# Patient Record
Sex: MALE | Race: WHITE | NOT HISPANIC OR LATINO | Employment: FULL TIME | URBAN - METROPOLITAN AREA
[De-identification: names, ages, dates, MRNs, and addresses within clinical notes are randomized per-mention and may not be internally consistent; named-entity substitution may affect disease eponyms.]

---

## 2018-02-05 ENCOUNTER — TREATMENT (OUTPATIENT)
Dept: PHYSICAL THERAPY | Facility: CLINIC | Age: 48
End: 2018-02-05

## 2018-02-05 ENCOUNTER — TRANSCRIBE ORDERS (OUTPATIENT)
Dept: PHYSICAL THERAPY | Facility: CLINIC | Age: 48
End: 2018-02-05

## 2018-02-05 DIAGNOSIS — M54.50 ACUTE BILATERAL LOW BACK PAIN WITHOUT SCIATICA: Primary | ICD-10-CM

## 2018-02-05 DIAGNOSIS — S39.012A STRAIN OF LUMBAR REGION, INITIAL ENCOUNTER: Primary | ICD-10-CM

## 2018-02-05 PROCEDURE — 97110 THERAPEUTIC EXERCISES: CPT | Performed by: PHYSICAL THERAPIST

## 2018-02-05 PROCEDURE — 97014 ELECTRIC STIMULATION THERAPY: CPT | Performed by: PHYSICAL THERAPIST

## 2018-02-05 PROCEDURE — 97001 PR PHYS THERAPY EVALUATION: CPT | Performed by: PHYSICAL THERAPIST
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
## 2018-02-09 ENCOUNTER — TREATMENT (OUTPATIENT)
Dept: PHYSICAL THERAPY | Facility: CLINIC | Age: 48
End: 2018-02-09

## 2018-02-09 DIAGNOSIS — M54.50 ACUTE BILATERAL LOW BACK PAIN WITHOUT SCIATICA: Primary | ICD-10-CM

## 2018-02-09 PROCEDURE — 97110 THERAPEUTIC EXERCISES: CPT | Performed by: PHYSICAL THERAPIST

## 2018-02-09 PROCEDURE — 97014 ELECTRIC STIMULATION THERAPY: CPT | Performed by: PHYSICAL THERAPIST

## 2018-02-09 NOTE — PROGRESS NOTES
Physical Therapy Daily Progress Note    TOTAL TIME: 80 MINUTES    Nickho Paez reports: back is steadily improving      Objective   See Exercise, Manual, and Modality Logs for complete treatment.     THERAPEUTIC EXERCISES/ACTIVITIES ADDED TODAY: see updated VHI picture sheet in media section    Assessment/Plan  PATIENT NEEDS CONTINUED PHYSICAL THERAPY IN ORDER TO ACHIEVE FULL ROM, STRENGTH AND FUNCTION WITH NO REPORTS OF PAIN IN ORDER TO RTW WITHOUT RESTRICTIONS AND WITHOUT PAIN OR DYSFUNCTION       Progress per Plan of Care and Progress strengthening /stabilization /functional activity           Manual Therapy:         mins  36025;  Therapeutic Exercise:    60     mins  85519;     Neuromuscular Roni:        mins  48238;    Therapeutic Activity:          mins  40706;     Gait Training:           mins  33353;     Ultrasound:          mins  29154;    Electrical Stimulation:    20     mins  56859 ( );  Dry Needling          mins self-pay    Timed Treatment:   60   mins   Total Treatment:     80   mins    Jesus Celis PT  Physical Therapist

## 2018-02-12 ENCOUNTER — TREATMENT (OUTPATIENT)
Dept: PHYSICAL THERAPY | Facility: CLINIC | Age: 48
End: 2018-02-12

## 2018-02-12 DIAGNOSIS — M54.50 ACUTE BILATERAL LOW BACK PAIN WITHOUT SCIATICA: Primary | ICD-10-CM

## 2018-02-12 PROCEDURE — 97014 ELECTRIC STIMULATION THERAPY: CPT | Performed by: PHYSICAL THERAPIST

## 2018-02-12 PROCEDURE — 97110 THERAPEUTIC EXERCISES: CPT | Performed by: PHYSICAL THERAPIST

## 2018-02-12 NOTE — PROGRESS NOTES
Physical Therapy Daily Progress Note    TOTAL TIME: 60 MINUTES    Nick Philippe reports: back is feeling a lot better; f/u with the MD this week      Objective   See Exercise, Manual, and Modality Logs for complete treatment.     THERAPEUTIC EXERCISES/ACTIVITIES ADDED TODAY: see flow sheets    Assessment/Plan  PATIENT NEEDS CONTINUED PHYSICAL THERAPY IN ORDER TO ACHIEVE FULL ROM, STRENGTH AND FUNCTION WITH NO REPORTS OF PAIN IN ORDER TO RTW WITHOUT RESTRICTIONS AND WITHOUT PAIN OR DYSFUNCTION; NEEDS VERBAL CUES TO PERFORM THERE EX CORRECTLY      Progress per Plan of Care and Progress strengthening /stabilization /functional activity           Manual Therapy:         mins  61921;  Therapeutic Exercise:    45     mins  30200;     Neuromuscular Roni:        mins  31189;    Therapeutic Activity:          mins  01987;     Gait Training:           mins  75837;     Ultrasound:          mins  07021;    Electrical Stimulation:    15     mins  86572 ( );  Dry Needling          mins self-pay    Timed Treatment:   45   mins   Total Treatment:     60   mins    Jesus Celis PT  Physical Therapist

## 2018-02-14 ENCOUNTER — TREATMENT (OUTPATIENT)
Dept: PHYSICAL THERAPY | Facility: CLINIC | Age: 48
End: 2018-02-14

## 2018-02-14 DIAGNOSIS — M54.50 ACUTE BILATERAL LOW BACK PAIN WITHOUT SCIATICA: Primary | ICD-10-CM

## 2018-02-14 PROCEDURE — 97110 THERAPEUTIC EXERCISES: CPT | Performed by: PHYSICAL THERAPIST

## 2018-02-14 NOTE — PROGRESS NOTES
Physical Therapy Daily Progress Note    TOTAL TIME: 75 MINUTES    Nick Paez reports: back is feeling a lot better; f/u with the MD today      Objective   See Exercise, Manual, and Modality Logs for complete treatment.     THERAPEUTIC EXERCISES/ACTIVITIES ADDED TODAY: supine clams with grey tband, SLR and SL ABD with PPT, prone alternating arm/leg raises    Assessment/Plan  Patient has made very good progress to date, may benefit from continued PT to improve functional strength and endurance     Progress per Plan of Care and Progress strengthening /stabilization /functional activity           Manual Therapy:         mins  84405;  Therapeutic Exercise:    55     mins  02853;     Neuromuscular Roni:        mins  70330;    Therapeutic Activity:          mins  74235;     Gait Training:           mins  13267;     Ultrasound:          mins  12683;    Electrical Stimulation:    20     mins  68934 ( );  Dry Needling          mins self-pay    Timed Treatment:   55   mins   Total Treatment:     75   mins    Jesus Celis, PT  Physical Therapist

## 2021-10-27 ENCOUNTER — TRANSCRIBE ORDERS (OUTPATIENT)
Dept: PHYSICAL THERAPY | Facility: CLINIC | Age: 51
End: 2021-10-27

## 2021-10-27 DIAGNOSIS — M54.50 LOW BACK PAIN, UNSPECIFIED BACK PAIN LATERALITY, UNSPECIFIED CHRONICITY, UNSPECIFIED WHETHER SCIATICA PRESENT: ICD-10-CM

## 2021-10-27 DIAGNOSIS — M54.2 PAIN, NECK: Primary | ICD-10-CM

## 2021-11-01 ENCOUNTER — TREATMENT (OUTPATIENT)
Dept: PHYSICAL THERAPY | Facility: CLINIC | Age: 51
End: 2021-11-01

## 2021-11-01 DIAGNOSIS — M54.2 PAIN, NECK: Primary | ICD-10-CM

## 2021-11-01 DIAGNOSIS — M54.50 CHRONIC BILATERAL LOW BACK PAIN WITHOUT SCIATICA: ICD-10-CM

## 2021-11-01 DIAGNOSIS — G89.29 CHRONIC BILATERAL LOW BACK PAIN WITHOUT SCIATICA: ICD-10-CM

## 2021-11-01 PROCEDURE — 97110 THERAPEUTIC EXERCISES: CPT | Performed by: PHYSICAL THERAPIST

## 2021-11-01 PROCEDURE — 97112 NEUROMUSCULAR REEDUCATION: CPT | Performed by: PHYSICAL THERAPIST

## 2021-11-01 PROCEDURE — 97162 PT EVAL MOD COMPLEX 30 MIN: CPT | Performed by: PHYSICAL THERAPIST

## 2021-11-01 PROCEDURE — 97140 MANUAL THERAPY 1/> REGIONS: CPT | Performed by: PHYSICAL THERAPIST
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
## 2021-11-04 ENCOUNTER — TREATMENT (OUTPATIENT)
Dept: PHYSICAL THERAPY | Facility: CLINIC | Age: 51
End: 2021-11-04

## 2021-11-04 DIAGNOSIS — M54.2 PAIN, NECK: Primary | ICD-10-CM

## 2021-11-04 DIAGNOSIS — M54.50 CHRONIC BILATERAL LOW BACK PAIN WITHOUT SCIATICA: ICD-10-CM

## 2021-11-04 DIAGNOSIS — G89.29 CHRONIC BILATERAL LOW BACK PAIN WITHOUT SCIATICA: ICD-10-CM

## 2021-11-04 PROCEDURE — 97112 NEUROMUSCULAR REEDUCATION: CPT | Performed by: PHYSICAL THERAPIST

## 2021-11-04 PROCEDURE — 97110 THERAPEUTIC EXERCISES: CPT | Performed by: PHYSICAL THERAPIST

## 2021-11-04 PROCEDURE — 97530 THERAPEUTIC ACTIVITIES: CPT | Performed by: PHYSICAL THERAPIST

## 2021-11-04 NOTE — PROGRESS NOTES
"   Physical Therapy Daily Progress Note  Visit: 2      Patient: Nick Paez   : 1970  Referring practitioner: Osito Sheldon MD  Visit Diagnosis:     ICD-10-CM ICD-9-CM   1. Pain, neck  M54.2 723.1   2. Chronic bilateral low back pain without sciatica  M54.50 724.2    G89.29 338.29     Date of Initial Visit: Type: THERAPY  Noted: 2021  Today's Date: 2021        Subjective     Nick Paez reports: Reports a lot of stiffness in his lower back today. Pn \"the usual\".    Treatment  Pre Treatment Pn Score: 6  Post Treatment Pn Score:  6      Exercise 1  Exercise Name 1: supine chin tucks  Sets/Reps 1: 15  Exercise 2  Exercise Name 2: supine chin tucks w/ retraction  Sets/Reps 2: 10  Time 2: 3 sec  Exercise 3  Exercise Name 3: supine chin tuck w/ rotation  Sets/Reps 3: 8  Exercise 4  Exercise Name 4: supine pec stretch-hands behind head  Sets/Reps 4: 5  Time 4: 10 sec  Exercise 5  Exercise Name 5: supine shoulder flexion stretch w/ cane-wide   Sets/Reps 5: 5  Time 5: 5 sec  Exercise 6  Exercise Name 6: PPT  Sets/Reps 6: 15  Exercise 7  Exercise Name 7: BKLL  Sets/Reps 7: 15  Exercise 8  Exercise Name 8: bridges  Sets/Reps 8: 15  Exercise 9  Exercise Name 9: clamshells-B  Sets/Reps 9: 15  Exercise 10  Exercise Name 10: corner pec stretch  Sets/Reps 10: 3  Time 10: 15 sec  Exercise 11  Exercise Name 11: B shoulder extn w/ cane  Sets/Reps 11: 10  Exercise 12  Exercise Name 12: standing chin tucks  Sets/Reps 12: 15                    Objective         Assessment & Plan     Assessment  Assessment details: Pt compliant w/ initial HEP. Focused today's visit primarily on improving thoracic extn to promote neutral alignment w/ posture, activation of DNFs/trans ab. He has difficulty isolating deep core/cervical stabilizers without compensating by recruiting larger muscle groups. Improved w/ cueing and repetition. Moderate tightness throughout R shoulder, some discomfort w/ stretching. No significant " increase in pn by end of visit. No changes made to HEP.    Plan  Plan details: May progress trans ab series if tolerated               Timed:  Manual Therapy:    0     mins  63434;  Therapeutic Exercise:    15     mins  80705;     Neuromuscular Roni:    10    mins  10819;    Therapeutic Activity:     11     mins  84102;     Gait Trainin     mins  01250;     Ultrasound:     0     mins  45223;    Electrical Stimulation:    0     mins  10971 ( );    Untimed:  Electrical Stimulation:    0     mins  46871 ( );  Mechanical Traction:    0     mins  65217;     Timed Treatment:   36   mins   Total Treatment:     36   mins      Amanda Paez, PT  Physical Therapist

## 2021-11-09 ENCOUNTER — TREATMENT (OUTPATIENT)
Dept: PHYSICAL THERAPY | Facility: CLINIC | Age: 51
End: 2021-11-09

## 2021-11-09 DIAGNOSIS — M54.50 CHRONIC BILATERAL LOW BACK PAIN WITHOUT SCIATICA: ICD-10-CM

## 2021-11-09 DIAGNOSIS — M54.2 PAIN, NECK: Primary | ICD-10-CM

## 2021-11-09 DIAGNOSIS — G89.29 CHRONIC BILATERAL LOW BACK PAIN WITHOUT SCIATICA: ICD-10-CM

## 2021-11-09 PROCEDURE — 97112 NEUROMUSCULAR REEDUCATION: CPT | Performed by: PHYSICAL THERAPIST

## 2021-11-09 PROCEDURE — 97110 THERAPEUTIC EXERCISES: CPT | Performed by: PHYSICAL THERAPIST

## 2021-11-09 PROCEDURE — 97530 THERAPEUTIC ACTIVITIES: CPT | Performed by: PHYSICAL THERAPIST

## 2021-11-09 NOTE — PROGRESS NOTES
Physical Therapy Daily Progress Note  Visit: 3      Patient: Nick Paez   : 1970  Referring practitioner: Osito Sheldon MD  Visit Diagnosis:     ICD-10-CM ICD-9-CM   1. Pain, neck  M54.2 723.1   2. Chronic bilateral low back pain without sciatica  M54.50 724.2    G89.29 338.29     Date of Initial Visit: Type: THERAPY  Noted: 2021  Today's Date: 2021        Subjective     Nick Paez reports: Was sore for the remainder of the evening after his last visit, improved by next day.    Treatment  Pre Treatment Pn Score: 4  Post Treatment Pn Score:  4      Exercise 1  Exercise Name 1: supine chin tucks  Sets/Reps 1: 15  Exercise 2  Exercise Name 2: supine chin tucks w/ retraction  Sets/Reps 2: 10  Exercise 3  Exercise Name 3: retraction w/ rotation  Sets/Reps 3: 8 ea  Exercise 4  Exercise Name 4: DNF squares  Sets/Reps 4: 5 ea  Exercise 5  Exercise Name 5: supine shoulder flexion stretch w/ cane  Sets/Reps 5: 2  Time 5: 5 sec  Exercise 6  Exercise Name 6: supine ER stretch w/ cane-30 deg abd w/ elbow prop  Sets/Reps 6: 5  Time 6: 5 sec  Exercise 7  Exercise Name 7: PPT  Sets/Reps 7: 20  Exercise 8  Exercise Name 8: BKLL  Sets/Reps 8: 20  Exercise 9  Exercise Name 9: clamshells  Exercise 10  Exercise Name 10: sidelying hip abd-R  Sets/Reps 10: 15  Exercise 11  Exercise Name 11: standing hip abd-L  Sets/Reps 11: 15                    Objective         Assessment & Plan     Assessment  Assessment details: Minimal soreness after last visit. Continued today w/ focus on facilitating recruitment of deep core/cervical stabilizers. Pt displayed improved control w/ pelvic tilts, cervical retractions today. OA nods remain difficult, tremulous. He is very weak throughout his core and hips, fatigued rapidly w/ low level strengthening activities. He reported fatigue at end of visit, no inc in pn. Issued updated HEP as outlined in chart.    Plan  Plan details: Cont w/ core,hip, cervical, and scapular  strengthening; may try sidesteps w/ TB, TG squats, LE glides w/ trans ab in hooklying               Timed:  Manual Therapy:    0     mins  24727;  Therapeutic Exercise:    13     mins  02123;     Neuromuscular Roni:    24    mins  23395;    Therapeutic Activity:     14     mins  79458;     Gait Trainin     mins  71394;     Ultrasound:     0     mins  22055;    Electrical Stimulation:    0     mins  86959 ( );    Untimed:  Electrical Stimulation:    0     mins  54300 ( );  Mechanical Traction:    0     mins  41767;     Timed Treatment:   51   mins   Total Treatment:     51   mins      Amanda Paez, PT  Physical Therapist

## 2021-11-09 NOTE — PATIENT INSTRUCTIONS
Access Code: [email or web address]  URL: [email or web address]  Date: 11/09/2021  Prepared by: Amanda Paez    Exercises  Supine Cervical Retraction with Towel - 2 x daily - 10 reps  Supine Cervical Rotation AROM on Pillow - 2 x daily - 10 reps  Supine Bridge - 2 x daily - 15-30 reps  Supine March with Posterior Pelvic Tilt - 2 x daily - 15-30 reps  Clamshell - 2 x daily - 15-30 reps  Shoulder External Rotation and Scapular Retraction - 2 x daily - 15-30 reps  Standing Hip Abduction with Counter Support - 2 x daily - 15-30 reps

## 2021-11-11 ENCOUNTER — TREATMENT (OUTPATIENT)
Dept: PHYSICAL THERAPY | Facility: CLINIC | Age: 51
End: 2021-11-11

## 2021-11-11 DIAGNOSIS — M54.2 PAIN, NECK: Primary | ICD-10-CM

## 2021-11-11 PROCEDURE — 97110 THERAPEUTIC EXERCISES: CPT | Performed by: PHYSICAL THERAPIST

## 2021-11-11 NOTE — PROGRESS NOTES
"   Physical Therapy Daily Progress Note      Patient: Nick Paez   : 1970  Diagnosis/ICD-10 Code:  Pain, neck [M54.2]  Referring practitioner: Osito Sheldon MD  Date of Initial Visit: Type: THERAPY  Noted: 2021  Today's Date: 2021  Patient seen for 4 sessions         Nick Paez     Subjective \"I am ok.  I have trouble with my back and neck.  Its always sore.  Nothing seems to help.\"    Objective   See Exercise, Manual, and Modality Logs for complete treatment.   Access Code: [email or web address]  URL: [email or web address]  Date: 2021  Prepared by: Yissel Martino    Exercises  Supine Bridge - 1 x daily - 7 x weekly - 3 sets - 10 reps  Supine Posterior Pelvic Tilt - 1 x daily - 7 x weekly - 3 sets - 10 reps  Supine Piriformis Stretch with Foot on Ground - 1 x daily - 7 x weekly - 3 sets - 10 reps  Seated Hamstring Stretch - 1 x daily - 7 x weekly - 3 sets - 10 reps  Hooklying Hamstring Stretch with Strap - 1 x daily - 7 x weekly - 3 sets - 10 reps  Supine Lower Trunk Rotation - 1 x daily - 7 x weekly - 3 sets - 10 reps  Shoulder External Rotation and Scapular Retraction with Resistance - 1 x daily - 7 x weekly - 3 sets - 10 reps  Scapular Retraction with Resistance - 1 x daily - 7 x weekly - 3 sets - 10 reps  Scapular Retraction with Resistance Advanced - 1 x daily - 7 x weekly - 3 sets - 10 reps  Plank on Counter - 1 x daily - 7 x weekly - 3 sets - 10 reps  Mountain Climber on Counter - 1 x daily - 7 x weekly - 3 sets - 10 reps        Assessment/Plan  Exercises and RTB provided.  Pt edu with importance of compliance and promoting self care with mobility.     Progress per Plan of Care           Manual Therapy:         mins  30925;  Therapeutic Exercise:    28     mins  49595;     Neuromuscular Roni:        mins  35525;    Therapeutic Activity:          mins  68825;     Gait Training:           mins  86372;     Ultrasound:          mins  98602;    Electrical Stimulation:         mins  14736 " ( );  Dry Needling          mins self-pay    Timed Treatment:   28   mins   Total Treatment:     28   mins    Roshni Martino, ENE  Physical Therapist Assistant

## 2021-11-12 ENCOUNTER — LAB (OUTPATIENT)
Dept: LAB | Facility: HOSPITAL | Age: 51
End: 2021-11-12

## 2021-11-12 ENCOUNTER — OFFICE VISIT (OUTPATIENT)
Dept: NEUROLOGY | Facility: CLINIC | Age: 51
End: 2021-11-12

## 2021-11-12 VITALS — BODY MASS INDEX: 31.5 KG/M2 | OXYGEN SATURATION: 97 % | HEART RATE: 95 BPM | HEIGHT: 71 IN | WEIGHT: 225 LBS

## 2021-11-12 DIAGNOSIS — R41.3 MEMORY LOSS: Primary | ICD-10-CM

## 2021-11-12 DIAGNOSIS — F07.81 POSTCONCUSSION SYNDROME: ICD-10-CM

## 2021-11-12 LAB
ALBUMIN SERPL-MCNC: 4.8 G/DL (ref 3.5–5.2)
ALBUMIN/GLOB SERPL: 2 G/DL
ALP SERPL-CCNC: 81 U/L (ref 39–117)
ALT SERPL W P-5'-P-CCNC: 26 U/L (ref 1–41)
AMMONIA BLD-SCNC: 19 UMOL/L (ref 16–60)
ANION GAP SERPL CALCULATED.3IONS-SCNC: 9.9 MMOL/L (ref 5–15)
AST SERPL-CCNC: 17 U/L (ref 1–40)
BASOPHILS # BLD AUTO: 0.03 10*3/MM3 (ref 0–0.2)
BASOPHILS NFR BLD AUTO: 0.5 % (ref 0–1.5)
BILIRUB SERPL-MCNC: 0.4 MG/DL (ref 0–1.2)
BUN SERPL-MCNC: 29 MG/DL (ref 6–20)
BUN/CREAT SERPL: 22.3 (ref 7–25)
CALCIUM SPEC-SCNC: 10.2 MG/DL (ref 8.6–10.5)
CHLORIDE SERPL-SCNC: 104 MMOL/L (ref 98–107)
CO2 SERPL-SCNC: 23.1 MMOL/L (ref 22–29)
CREAT SERPL-MCNC: 1.3 MG/DL (ref 0.76–1.27)
DEPRECATED RDW RBC AUTO: 39 FL (ref 37–54)
EOSINOPHIL # BLD AUTO: 0.14 10*3/MM3 (ref 0–0.4)
EOSINOPHIL NFR BLD AUTO: 2.1 % (ref 0.3–6.2)
ERYTHROCYTE [DISTWIDTH] IN BLOOD BY AUTOMATED COUNT: 12.8 % (ref 12.3–15.4)
FOLATE SERPL-MCNC: 5.04 NG/ML (ref 4.78–24.2)
GFR SERPL CREATININE-BSD FRML MDRD: 58 ML/MIN/1.73
GLOBULIN UR ELPH-MCNC: 2.4 GM/DL
GLUCOSE SERPL-MCNC: 206 MG/DL (ref 65–99)
HCT VFR BLD AUTO: 44.3 % (ref 37.5–51)
HGB BLD-MCNC: 15.3 G/DL (ref 13–17.7)
IMM GRANULOCYTES # BLD AUTO: 0.01 10*3/MM3 (ref 0–0.05)
IMM GRANULOCYTES NFR BLD AUTO: 0.2 % (ref 0–0.5)
LYMPHOCYTES # BLD AUTO: 1.56 10*3/MM3 (ref 0.7–3.1)
LYMPHOCYTES NFR BLD AUTO: 23.9 % (ref 19.6–45.3)
MCH RBC QN AUTO: 29.1 PG (ref 26.6–33)
MCHC RBC AUTO-ENTMCNC: 34.5 G/DL (ref 31.5–35.7)
MCV RBC AUTO: 84.4 FL (ref 79–97)
MONOCYTES # BLD AUTO: 0.46 10*3/MM3 (ref 0.1–0.9)
MONOCYTES NFR BLD AUTO: 7.1 % (ref 5–12)
NEUTROPHILS NFR BLD AUTO: 4.32 10*3/MM3 (ref 1.7–7)
NEUTROPHILS NFR BLD AUTO: 66.2 % (ref 42.7–76)
NRBC BLD AUTO-RTO: 0 /100 WBC (ref 0–0.2)
PLATELET # BLD AUTO: 239 10*3/MM3 (ref 140–450)
PMV BLD AUTO: 11.4 FL (ref 6–12)
POTASSIUM SERPL-SCNC: 5.7 MMOL/L (ref 3.5–5.2)
PROT SERPL-MCNC: 7.2 G/DL (ref 6–8.5)
RBC # BLD AUTO: 5.25 10*6/MM3 (ref 4.14–5.8)
SODIUM SERPL-SCNC: 137 MMOL/L (ref 136–145)
TSH SERPL DL<=0.05 MIU/L-ACNC: 2.01 UIU/ML (ref 0.27–4.2)
VIT B12 BLD-MCNC: 438 PG/ML (ref 211–946)
WBC # BLD AUTO: 6.52 10*3/MM3 (ref 3.4–10.8)

## 2021-11-12 PROCEDURE — 99205 OFFICE O/P NEW HI 60 MIN: CPT | Performed by: PHYSICIAN ASSISTANT

## 2021-11-12 PROCEDURE — 82140 ASSAY OF AMMONIA: CPT | Performed by: PHYSICIAN ASSISTANT

## 2021-11-12 PROCEDURE — 36415 COLL VENOUS BLD VENIPUNCTURE: CPT | Performed by: PHYSICIAN ASSISTANT

## 2021-11-12 PROCEDURE — 84443 ASSAY THYROID STIM HORMONE: CPT | Performed by: PHYSICIAN ASSISTANT

## 2021-11-12 PROCEDURE — 85025 COMPLETE CBC W/AUTO DIFF WBC: CPT | Performed by: PHYSICIAN ASSISTANT

## 2021-11-12 PROCEDURE — 82746 ASSAY OF FOLIC ACID SERUM: CPT | Performed by: PHYSICIAN ASSISTANT

## 2021-11-12 PROCEDURE — 80053 COMPREHEN METABOLIC PANEL: CPT | Performed by: PHYSICIAN ASSISTANT

## 2021-11-12 PROCEDURE — 82607 VITAMIN B-12: CPT | Performed by: PHYSICIAN ASSISTANT

## 2021-11-12 RX ORDER — CANAGLIFLOZIN 300 MG/1
TABLET, FILM COATED ORAL
COMMUNITY
Start: 2021-10-23 | End: 2022-03-10

## 2021-11-12 RX ORDER — METFORMIN HYDROCHLORIDE 500 MG/1
TABLET, EXTENDED RELEASE ORAL
COMMUNITY
Start: 2021-10-23

## 2021-11-12 RX ORDER — CYCLOBENZAPRINE HCL 10 MG
TABLET ORAL
COMMUNITY
Start: 2021-08-16 | End: 2021-11-12

## 2021-11-12 RX ORDER — MELOXICAM 7.5 MG/1
TABLET ORAL
COMMUNITY
Start: 2021-08-16 | End: 2021-11-12

## 2021-11-12 RX ORDER — DULAGLUTIDE 0.75 MG/.5ML
INJECTION, SOLUTION SUBCUTANEOUS
COMMUNITY
Start: 2021-09-20 | End: 2022-06-22

## 2021-11-12 RX ORDER — LOSARTAN POTASSIUM 100 MG/1
TABLET ORAL
COMMUNITY
Start: 2021-09-18

## 2021-11-12 RX ORDER — ATORVASTATIN CALCIUM 20 MG/1
TABLET, FILM COATED ORAL
COMMUNITY
Start: 2021-10-25 | End: 2022-06-22
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
## 2021-11-15 ENCOUNTER — TREATMENT (OUTPATIENT)
Dept: PHYSICAL THERAPY | Facility: CLINIC | Age: 51
End: 2021-11-15

## 2021-11-15 DIAGNOSIS — M54.50 CHRONIC BILATERAL LOW BACK PAIN WITHOUT SCIATICA: ICD-10-CM

## 2021-11-15 DIAGNOSIS — M54.2 PAIN, NECK: Primary | ICD-10-CM

## 2021-11-15 DIAGNOSIS — G89.29 CHRONIC BILATERAL LOW BACK PAIN WITHOUT SCIATICA: ICD-10-CM

## 2021-11-15 PROCEDURE — 97530 THERAPEUTIC ACTIVITIES: CPT | Performed by: PHYSICAL THERAPIST

## 2021-11-15 PROCEDURE — 97110 THERAPEUTIC EXERCISES: CPT | Performed by: PHYSICAL THERAPIST

## 2021-11-15 PROCEDURE — 97112 NEUROMUSCULAR REEDUCATION: CPT | Performed by: PHYSICAL THERAPIST

## 2021-11-15 NOTE — PROGRESS NOTES
"   Physical Therapy Daily Progress Note  Visit: 5      Patient: Nick Paez   : 1970  Referring practitioner: Osito Sheldon MD  Visit Diagnosis:     ICD-10-CM ICD-9-CM   1. Pain, neck  M54.2 723.1   2. Chronic bilateral low back pain without sciatica  M54.50 724.2    G89.29 338.29     Date of Initial Visit: Type: THERAPY  Noted: 2021  Today's Date: 11/15/2021        Subjective     Nick Paez reports: \"I feel sore.\" Completed neuro consult, being referred to  (unable to recall what for) and post-concussive clinic at [facility]. Clamshells feel weak and sore.    Treatment  Pre Treatment Pn Score: 5  Post Treatment Pn Score:  5      Exercise 1  Exercise Name 1: rows  Equipment/Resistance 1: RTB  Sets/Reps 1: 20  Exercise 2  Exercise Name 2: shoulder extn  Equipment/Resistance 2: RTB  Sets/Reps 2: 20  Exercise 3  Exercise Name 3: no money  Equipment/Resistance 3: RTB  Sets/Reps 3: 15  Exercise 4  Exercise Name 4: TB pullaparts  Equipment/Resistance 4: RTB  Sets/Reps 4: 15  Exercise 5  Exercise Name 5: B shoulder extn + scap retraction w/ cane  Sets/Reps 5: 20  Exercise 6  Exercise Name 6: sidesteps  Equipment/Resistance 6: RTB at knees  Sets/Reps 6: 15ft x 2 laps  Exercise 7  Exercise Name 7: TG Squats  Sets/Reps 7: 30  Exercise 8  Exercise Name 8: supine clam w/ GTB  Sets/Reps 8: 30  Exercise 9  Exercise Name 9: bridges  Sets/Reps 9: 30  Exercise 10  Exercise Name 10: LE glides w/ trans ab  Sets/Reps 10: 20 ea                    Objective         Assessment & Plan     Assessment  Assessment details: Pt appears to be tolerating inc exercise volume, progression to light resistance w/ postural and hip strengthening well. Noted min to no increase in pn. However fatigued rapidly w/ resisted sidesteps and TG squats.He has good awareness of trans ab activation, able to maintain w/ progression to LE glides in hooklying. Modified clamshells to hooklying w/ TB due to complaint of soreness/difficulty in sidelying. " Issued GTB and recommended doing the same at home.    Plan  Plan details: Cont to progress strengthening activities/conditioning as pt tolerates               Timed:  Manual Therapy:    0     mins  30568;  Therapeutic Exercise:    25     mins  05451;     Neuromuscular Roni:    10    mins  68824;    Therapeutic Activity:     14     mins  28431;     Gait Trainin     mins  90213;     Ultrasound:     0     mins  94827;    Electrical Stimulation:    0     mins  96473 ( );    Untimed:  Electrical Stimulation:    0     mins  59292 ( );  Mechanical Traction:    0     mins  63441;     Timed Treatment:   49   mins   Total Treatment:     49   mins      Amanda Paez, PT  Physical Therapist

## 2021-11-18 ENCOUNTER — TREATMENT (OUTPATIENT)
Dept: PHYSICAL THERAPY | Facility: CLINIC | Age: 51
End: 2021-11-18

## 2021-11-18 DIAGNOSIS — G89.29 CHRONIC BILATERAL LOW BACK PAIN WITHOUT SCIATICA: ICD-10-CM

## 2021-11-18 DIAGNOSIS — M54.2 PAIN, NECK: Primary | ICD-10-CM

## 2021-11-18 DIAGNOSIS — M54.50 CHRONIC BILATERAL LOW BACK PAIN WITHOUT SCIATICA: ICD-10-CM

## 2021-11-18 PROCEDURE — 97112 NEUROMUSCULAR REEDUCATION: CPT | Performed by: PHYSICAL THERAPIST

## 2021-11-18 PROCEDURE — 97110 THERAPEUTIC EXERCISES: CPT | Performed by: PHYSICAL THERAPIST

## 2021-11-18 PROCEDURE — 97530 THERAPEUTIC ACTIVITIES: CPT | Performed by: PHYSICAL THERAPIST

## 2021-11-18 NOTE — PROGRESS NOTES
Physical Therapy Daily Treatment Note      Patient: Nick Paez   : 1970  Referring practitioner: Osito Sheldon MD  Date of Initial Visit: Type: THERAPY  Noted: 2021  Today's Date: 2021  Patient seen for 6 sessions       Visit Diagnoses:    ICD-10-CM ICD-9-CM   1. Pain, neck  M54.2 723.1   2. Chronic bilateral low back pain without sciatica  M54.50 724.2    G89.29 338.29       Subjective   Nick Paez reports: Back feels sore today, neck is fine at the moment. Experienced soreness in both quads after last visit, no exacerbation of neck/back pn.    Pre tx pn score: 5-midline low back  Post tx pn score: 3      Objective   See Exercise, Manual, and Modality Logs for complete treatment.       Assessment & Plan     Assessment  Assessment details: Notes leg soreness w/ exercise progressions from last visit, but no worsening of symptoms. Pt demo improved performance w/ his exercises, able to complete w/o rest breaks though still somewhat slow to perform. Minimized further progression today as he appears to be appropriately challenged w/ his current program. Reported slight decrease in pn by end of visit. Pt continues to demonstrate profound postural, core, hip weakness that limits his tolerance to daily and work activities. He would benefit from ongoing PT to maximize strength/endurance, decrease pn, and allow return to full work activities and ADLs w/o pn or dysfunction.    Plan  Plan details: Cont w/ strengthening, stabilization, functional activities          Timed:         Manual Therapy:    0     mins  39338;     Therapeutic Exercise:    24     mins  68113;     Neuromuscular Roni:    10    mins  27261;    Therapeutic Activity:     12     mins  63944;     Gait Trainin     mins  19161;     Ultrasound:     0     mins  22512;    Ionto                               0    mins   39734  Self Care                       0     mins   23448  Canalith Repos    0     mins  61121      Un-Timed:  Electrical Stimulation:    0     mins  46056 ( );  Dry Needling     0     mins self-pay  Traction     0     mins 34588      Timed Treatment:   46   mins   Total Treatment:     46   mins    Amanda Paez, PT  [place] License: #[id number]

## 2021-11-22 ENCOUNTER — TRANSCRIBE ORDERS (OUTPATIENT)
Dept: PHYSICAL THERAPY | Facility: CLINIC | Age: 51
End: 2021-11-22

## 2021-12-01 ENCOUNTER — TREATMENT (OUTPATIENT)
Dept: PHYSICAL THERAPY | Facility: CLINIC | Age: 51
End: 2021-12-01

## 2021-12-01 DIAGNOSIS — M54.50 CHRONIC BILATERAL LOW BACK PAIN WITHOUT SCIATICA: ICD-10-CM

## 2021-12-01 DIAGNOSIS — M54.2 PAIN, NECK: Primary | ICD-10-CM

## 2021-12-01 DIAGNOSIS — G89.29 CHRONIC BILATERAL LOW BACK PAIN WITHOUT SCIATICA: ICD-10-CM

## 2021-12-01 PROCEDURE — 97530 THERAPEUTIC ACTIVITIES: CPT | Performed by: PHYSICAL THERAPIST

## 2021-12-01 PROCEDURE — 97110 THERAPEUTIC EXERCISES: CPT | Performed by: PHYSICAL THERAPIST

## 2021-12-01 NOTE — PROGRESS NOTES
"Physical Therapy Reassessment  Patient: Nick Paez   : 1970  Diagnosis/ICD-10 Code:  Pain, neck [M54.2]  Referring practitioner: Osito Sheldon MD  Date of Initial Visit: 2021  Today's Date: 12/3/2021  Patient seen for 7 sessions         Visit Diagnoses:    ICD-10-CM ICD-9-CM   1. Pain, neck  M54.2 723.1   2. Chronic bilateral low back pain without sciatica  M54.50 724.2    G89.29 338.29       Subjective Questionnaire: NDI:=40% impairment  Oswestry: =42% impairment  Clinical Progress: improved  Home Program Compliance: Yes  Treatment has included: therapeutic exercise, neuromuscular re-education, manual therapy and therapeutic activity      Subjective   Nick Paez reports:Past couple of days have been \"pretty bad\". Had to drive to [place] Monday and has hurt more since. Prior to that had seen only slight changes in his pn. 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 Has not noticed any UE paresthesia recently.    Pre tx pn score: 9  Post tx pn score: 8      Objective          Postural Observations  Seated posture: poor  Standing posture: poor        Tenderness     Additional Tenderness Details  TTP B UT, LS, mid/lower cervical paraspinals, CT junction  TTP lumbar spinous processes, facets, paravertebrals bilaterally    Neurological Testing     Sensation   Cervical/Thoracic   Left   Intact: light touch    Right   Intact: light touch    Lumbar   Left   Intact: light touch    Right   Intact: light touch    Reflexes   Left   Biceps (C5/C6): absent (0)  Brachioradialis (C6): absent (0)  Patellar (L4): trace (1+)  Achilles (S1): trace (1+)    Right   Biceps (C5/C6): absent (0)  Brachioradialis (C6): absent (0)  Patellar (L4): trace (1+)  Achilles (S1): trace (1+)    Active Range of Motion   Cervical/Thoracic Spine   Cervical    Flexion: 70 degrees   Extension: 45 degrees   Left lateral flexion: 30 " degrees   Right lateral flexion: 45 degrees   Left rotation: 60 degrees   Right rotation: 60 degrees     Additional Active Range of Motion Details  flxn-tightness L>R  R SB-ipsilateral tightness (lateral cervical)  L SB-ipsilateral pn and contralateral tightness  L rotation-ipsilateral pn  Trunk Flxn 100% without pn  Trunk Extn 30 deg with mild pn  Trunk Rotation L WFL, tightness, mild pn R WFL no pn  Trunk SB L fingertips past KJL w/ L side pn  R fingertips past KJL w/ R side pn      Strength/Myotome Testing     Left Shoulder     Planes of Motion   External rotation at 0°: 4     Isolated Muscles   Middle trapezius: 4+     Right Shoulder     Planes of Motion   External rotation at 0°: 4     Isolated Muscles   Middle trapezius: 4-     Left Elbow   Flexion: 5  Extension: 5    Right Elbow   Flexion: 5  Extension: 5    Left Wrist/Hand   Wrist extension: 5  Wrist flexion: 5    Right Wrist/Hand   Wrist extension: 5  Wrist flexion: 5    Left Hip   Planes of Motion   Flexion: 5  Extension: 4-    Right Hip   Planes of Motion   Flexion: 4  Extension: 4    Left Knee   Flexion: 5  Extension: 5    Right Knee   Flexion: 5  Extension: 5    Left Ankle/Foot   Dorsiflexion: 5  Plantar flexion: 5  Great toe extension: 5    Right Ankle/Foot   Dorsiflexion: 5  Plantar flexion: 5  Great toe extension: 5    Muscle Activation   Patient able to activate left transverse abdominals, left multifidus, right transverse abdominals and right multifidus.     Tests     Lumbar     Left   Negative passive SLR.     Right   Negative passive SLR.     Additional Tests Details  Cervical distraction/compression (-)      See Exercise, Manual, and Modality Logs for complete treatment.       Assessment & Plan     Assessment    Assessment details: Reassessment performed. Pt has completed 7 PT visits. Demo improved trunk/cervical mobility, mildly improved postural awareness, improved deep core stab function, and denies any recent paresthesia of the RUE. Pn  minimally changed however. Cont to have constant neck and back pn, worsened w/ extended standing and most prolonged positioning. Discussed use of lumbar support w/ driving and prolonged sitting. Issued updated HEP as outlined in pt chart. Initiated light stretching program today, as primary focus up to this point has been core stab activation, postural awareness. Pt would benefit from ongoing PT to assist w/ return to full daily/work activities w/o pn or dysfunction.    Goals  Plan Goals: STG 4 wks  1) Pt to be compliant w/ initial HEP for ROM, strength and symptom mgmt.-MET  2) Pt to report pn w/ daily and work activities to be no greater than 6/10.-ONGOING  3) Pt to demo restored cervical mobility in all planes.-PROGRESSING  4) Pt to demo improved activation of the DNFs and deep core stabilizers.-MET      LTG 8 wks  1) Pt to be independent w/ long term HEP and self mgmt.-PROGRESSING  2) Pt to report pn w/ daily and work activities to be no greater than 2/10.-ONGOING  3) Pt to report resolution of RUE paresthesia.-PROGRESSING  4) Pt to improve Mod Oswestry score to 10/50 or better to reflect improved pn and function.-ONGOING  5) Pt to improve NDI score to 12/50 or better to reflect improved pn and function.-ONGOING    Plan  Plan details: Cont w/ focus on trunk/neck flexibility, deep core stab, postural reeducation, and general strengthening/conditioning        Progress toward previous goals: Not Met        Timed:         Manual Therapy:    0     mins  52170;    Therapeutic Exercise:    24     mins  66418;     Neuromuscular Roni:    0    mins  58531;    Therapeutic Activity:     12     mins  65360;     Gait Trainin     mins  10545;     Ultrasound:     0     mins  66394;    Ionto                               0    mins   49233  Self Care                       0     mins   20843  Canalith Repos    0     mins 63426      Un-Timed:  Electrical Stimulation:    0     mins  43635 ( );  Dry Needling     0      mins self-pay  Traction     0     mins 15033  Re-Eval                           0    mins  58331      Timed Treatment:   36   mins   Total Treatment:     36   mins          PT: Amanda Paez, PT     [place] License: #[id number]    Electronically signed by Amanda Paez, PT, 12/01/21, 3:25 PM EST

## 2021-12-01 NOTE — PATIENT INSTRUCTIONS
Access Code: [email or web address]  URL: [email or web address]  Date: 12/01/2021  Prepared by: Amanda Paez    Exercises  Seated Gentle Upper Trapezius Stretch - 1 x daily - 3 reps - 15 sec hold  Shoulder External Rotation and Scapular Retraction with Resistance - 1 x daily - 15-30 reps  Standing Shoulder Horizontal Abduction with Resistance - 1 x daily - 15-30 reps  Quadruped Rocking Backward - 1 x daily - 3 reps - 15 sec hold  Cat-Camel - 1 x daily - 10 reps  Side Stepping with Resistance at Thighs - 1 x daily - 15-30 reps  Supine Bridge - 1 x daily - 15-30 reps  Supine Pelvic Tilt with leg glides - 1 x daily - 3 sets - 10 reps

## 2021-12-09 ENCOUNTER — TREATMENT (OUTPATIENT)
Dept: PHYSICAL THERAPY | Facility: CLINIC | Age: 51
End: 2021-12-09

## 2021-12-09 DIAGNOSIS — M54.2 PAIN, NECK: Primary | ICD-10-CM

## 2021-12-09 PROCEDURE — 97110 THERAPEUTIC EXERCISES: CPT | Performed by: PHYSICAL THERAPIST

## 2021-12-09 PROCEDURE — 97140 MANUAL THERAPY 1/> REGIONS: CPT | Performed by: PHYSICAL THERAPIST

## 2021-12-10 NOTE — PROGRESS NOTES
"   Physical Therapy Daily Progress Note        Patient: Nick Paez   : 1970  Diagnosis/ICD-10 Code:  Pain, neck [M54.2]  Referring practitioner: Osito Sheldon MD  Date of Initial Visit: Type: THERAPY  Noted: 2021  Today's Date: 12/10/2021  Patient seen for 8 sessions         Nick Paez       Subjective pt reports limited progress since SOC.  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  I     Objective   See Exercise, Manual, and Modality Logs for complete treatment.       Assessment/Plan Postural education and cuing to avoid slump sitting.  Added wall walks with forward stepping opposite UE/LE and wall angels for HEP.  Pt c/o L hip and R shoudler pain with exercises.    Progress per Plan of Care           Manual Therapy:    15     mins  15392;  Therapeutic Exercise:    15     mins  60644;     Neuromuscular Roni:        mins  12858;    Therapeutic Activity:          mins  59590;     Gait Training:           mins  11769;     Ultrasound:          mins  09371;    Electrical Stimulation:         mins  08549 ( );  Dry Needling          mins self-pay    Timed Treatment:   30   mins   Total Treatment:     30   mins    Roshni Martino PTA  Physical Therapist Assistant                    " OFFICE VISIT    History    Jordin Byrne is a 30 year old male with past medical history of HTN, morbid obesity, MDD, anxiety, chronic back pain   who presents today for :    HTN:  No complaints of headaches, dizziness, or vision changes.    Denies chest pain, palpitations, shortness of breath.   There is not edema.    Chronic back pain:  Hx of lumbar discectomies for chronic low back pain.  Has chronic R sided neck pain  R sided thoracic back pain t and b/l low back pain. He notes he is seeing a chiropractor and using CBD oil. He notes he hasnt missed work very much recently but notes he sometimes will wake up with severe low back pain and it will take a few hours for the stiffness to dissipate enough for him to get to work.    Obesity:  He notes he had been doing really well but when his depression gets bad he stops grocery shopping.    ----------------  Psych:  Patients psychiatric symptoms well controlled on current regimen. No evidence of worsening anxiety or depressed symptoms. No safety issues, no SI/HI.  No side effects on current regimen. Sees Dr. Ulloa.       10 point review of systems reviewed and negative except as above.    There are no preventive care reminders to display for this patient.  Current Outpatient Medications   Medication Sig Dispense Refill   • amLODIPine (NORVASC) 5 MG tablet TAKE 1 TABLET BY MOUTH EVERY DAY 30 tablet 1   • cyclobenzaprine (FLEXERIL) 5 MG tablet Take 1 tablet by mouth at bedtime. 30 tablet 0   • METHYLPHENIDATE HCL PO      • Venlafaxine HCl (EFFEXOR PO)      • fluticasone (FLONASE) 50 MCG/ACT nasal spray Spray 1 spray in each nostril 2 times daily as needed (sinus pressure/congestion). 16 g 1   • DIAZepam (VALIUM) 5 MG tablet TAKE 1 TABLET BY MOUTH EVERY 12 HOURS AS NEEDED FOR ANXIETY. 60 tablet 5     No current facility-administered medications for this visit.      ALLERGIES:   Allergen Reactions   • Ceclor [Cefaclor] RASH     Past Medical History:   Diagnosis Date    • Anxiety and depression    • Obesity      Past Surgical History:   Procedure Laterality Date   • Appendectomy     • Lumbar discectomy       Family History   Problem Relation Age of Onset   • Anxiety disorder Mother    • Depression Mother    • Cancer Father    • Anxiety disorder Father    • Depression Father    • Stroke Paternal Grandmother      Social History     Socioeconomic History   • Marital status: Single     Spouse name: Not on file   • Number of children: Not on file   • Years of education: Not on file   • Highest education level: Not on file   Occupational History   • Not on file   Social Needs   • Financial resource strain: Not on file   • Food insecurity:     Worry: Not on file     Inability: Not on file   • Transportation needs:     Medical: Not on file     Non-medical: Not on file   Tobacco Use   • Smoking status: Former Smoker     Types: Cigars     Last attempt to quit: 2013     Years since quittin.8   • Smokeless tobacco: Never Used   • Tobacco comment: rare use   Substance and Sexual Activity   • Alcohol use: Yes     Alcohol/week: 0.0 standard drinks   • Drug use: Not on file   • Sexual activity: Not on file   Lifestyle   • Physical activity:     Days per week: Not on file     Minutes per session: Not on file   • Stress: Not on file   Relationships   • Social connections:     Talks on phone: Not on file     Gets together: Not on file     Attends Yarsani service: Not on file     Active member of club or organization: Not on file     Attends meetings of clubs or organizations: Not on file     Relationship status: Not on file   • Intimate partner violence:     Fear of current or ex partner: Not on file     Emotionally abused: Not on file     Physically abused: Not on file     Forced sexual activity: Not on file   Other Topics Concern   • Not on file   Social History Narrative    Single    No kids    Works as a  for [employer]    No t/etoh         Physical Exam      Vital  Signs:    Visit Vitals  /88   Pulse 100   Resp 20   Ht 6' 1\" (1.854 m)   Wt (!) 161.5 kg   BMI 46.97 kg/m²     Pulse Ox Interpretation:  Pulse ox not performed  General:  Alert, cooperative, conversive.  Skin:  Warm and dry without rash.    Head:  Normocephalic-atraumatic.   Neck:  Trachea is midline. No adenopathy.  Normal thyroid without mass or tenderness..   Eyes:  Normal conjunctivae and sclerae.  Pupils equal, round, reactive to light.  Extraocular movements intact.  ENT:  Mucous membranes are moist.  Normal tympanic membranes and external auditory canals bilaterally.  No pharyngeal erythema or exudate.  No facial tenderness.  Normal nasal mucosa.  Cardiovascular:  Symmetrical pulses.  Regular, rate and rhythm without murmur.  Respiratory:  Normal respiratory effort.  Clear to auscultation.  No wheezes, rales or rhonchi.  Gastrointestinal:  Soft and nontender.  Normal bowel sounds.  No hepatomegaly or splenomegaly.   Musculoskeletal:  Trigger point R side of back mid thoracic level between spine and scapula. He has paraspinal R sided neck pain but no weakness.   Neurologic:   Oriented times 4.  Cranial nerves 2-12 are intact.  No focal deficits or lateralizing signs.  Psychiatric:   Cooperative.  Appropriate mood and affect.      Assessment & Plan    Need for vaccination  (primary encounter diagnosis)  Plan: INFLUENZA QUADRIVALENT SPLIT 0.5 ML VACC         MULTIDOSE, IM (FLUAVAL,FLUZONE)    Chronic back pain, unspecified back location, unspecified back pain laterality  Plan: pt is interested in FMLA paperwork to be completed for periodic back pain flares, advised we can do this, for now he needs to continue f/u with chiropractor, focus on weight loss and stretching, can continue tylenol, motrin and cbd oil     Essential hypertension  Plan: Patient compliant with medications.No side effects on current regimen. Continue present management.  Follow up instructions discussed.        Morbid obesity with BMI  of 45.0-49.9, adult (CMS/[facility])  Plan: Diet and exercise counseling provided.      Orders Placed This Encounter   • INFLUENZA QUADRIVALENT SPLIT 0.5 ML VACC MULTIDOSE, IM (FLUAVAL,FLUZONE)     Return in about 6 months (around 3/19/2020).    (F41.9) Anxiety  (primary encounter diagnosis)  (F32.0) Mild major depression (CMS/[facility])  Plan: continue f/u with Dr. Ulloa, he is on diazepam, methylphenidate and effexor  (E66.01,  Z68.42) Morbid obesity with BMI of 45.0-49.9, adult (CMS/[facility])  Plan: THYROID STIMULATING HORMONE REFLEX, LIPID PANEL        WITH REFLEX, GLYCOHEMOGLOBIN       Diet and exercise counseling provided.              Follow-up instructions provided.  Red flags discussed.    Instructed patient on correct medication dosing, usage and adverse effects (if applicable).  All questions and concerns discussed.   Patient agreeable to plan.      Hayley Ferrer MD  Family Medicine  [address] [address] [place] [address], [address]  [place] [address]  Telephone: [phone]    Fax: [phone]

## 2021-12-13 ENCOUNTER — TREATMENT (OUTPATIENT)
Dept: PHYSICAL THERAPY | Facility: CLINIC | Age: 51
End: 2021-12-13

## 2021-12-13 DIAGNOSIS — M54.50 CHRONIC BILATERAL LOW BACK PAIN WITHOUT SCIATICA: ICD-10-CM

## 2021-12-13 DIAGNOSIS — G89.29 CHRONIC BILATERAL LOW BACK PAIN WITHOUT SCIATICA: ICD-10-CM

## 2021-12-13 DIAGNOSIS — M54.2 PAIN, NECK: Primary | ICD-10-CM

## 2021-12-13 PROCEDURE — 97530 THERAPEUTIC ACTIVITIES: CPT | Performed by: PHYSICAL THERAPIST

## 2021-12-13 PROCEDURE — 97140 MANUAL THERAPY 1/> REGIONS: CPT | Performed by: PHYSICAL THERAPIST

## 2021-12-13 PROCEDURE — 97110 THERAPEUTIC EXERCISES: CPT | Performed by: PHYSICAL THERAPIST

## 2021-12-13 PROCEDURE — 97112 NEUROMUSCULAR REEDUCATION: CPT | Performed by: PHYSICAL THERAPIST

## 2021-12-13 NOTE — PROGRESS NOTES
Physical Therapy Daily Treatment Note      Patient: Nick Paez   : 1970  Referring practitioner: Osito Sheldon MD  Date of Initial Visit: Type: THERAPY  Noted: 2021  Today's Date: 2021  Patient seen for 9 sessions       Visit Diagnoses:    ICD-10-CM ICD-9-CM   1. Pain, neck  M54.2 723.1   2. Chronic bilateral low back pain without sciatica  M54.50 724.2    G89.29 338.29       Subjective   Nick Paez reports: Neck was extremely sore the day after his last visit, but seemed to improve. Denies neck pn today, but reports pn and tightness in the R low back.     Pre tx pn score: 4-R low back  Post tx pn score: 4      Objective   See Exercise, Manual, and Modality Logs for complete treatment.       Assessment & Plan     Assessment    Assessment details: Pt had difficulty tolerating supine/hooklying positioning for MT techniques 2/2 inc L low back pn. Demo improved L cervical SB ROM w/o pn after MT techinques, but increased R cervical pn w/ R SB and rotation. Notes inc pn w/ lumbar extn during cat/camel exercise, and fatigued rapidly w/ TG squats. Pt would benefit from continued PT to improve cervical/lumbar flexibility, strength, endurance and allow return to work and daily activities w/o pn or limitation.    Plan  Plan details: Cont w/ stretching, strengthening, MT techniques to restore pn free trunk/cervical mobility          Timed:         Manual Therapy:    8     mins  25771;     Therapeutic Exercise:    15     mins  68453;     Neuromuscular Roni:    9    mins  73940;    Therapeutic Activity:     12     mins  79296;     Gait Trainin     mins  83072;     Ultrasound:     0     mins  76727;    Ionto                               0    mins   41898  Self Care                       0     mins   77521  Canalith Repos    0     mins 64041      Un-Timed:  Electrical Stimulation:    0     mins  37462 (MC );  Dry Needling     0     mins self-pay  Traction     0     mins 89181      Timed  Treatment:   44   mins   Total Treatment:     44   mins    Amanda Paez, PT  [place] License: #[id number]

## 2021-12-20 ENCOUNTER — TREATMENT (OUTPATIENT)
Dept: PHYSICAL THERAPY | Facility: CLINIC | Age: 51
End: 2021-12-20

## 2021-12-20 DIAGNOSIS — M54.2 PAIN, NECK: Primary | ICD-10-CM

## 2021-12-20 DIAGNOSIS — G89.29 CHRONIC BILATERAL LOW BACK PAIN WITHOUT SCIATICA: ICD-10-CM

## 2021-12-20 DIAGNOSIS — M54.50 CHRONIC BILATERAL LOW BACK PAIN WITHOUT SCIATICA: ICD-10-CM

## 2021-12-20 PROCEDURE — 97110 THERAPEUTIC EXERCISES: CPT | Performed by: PHYSICAL THERAPIST

## 2021-12-20 PROCEDURE — 97140 MANUAL THERAPY 1/> REGIONS: CPT | Performed by: PHYSICAL THERAPIST

## 2021-12-20 PROCEDURE — 97112 NEUROMUSCULAR REEDUCATION: CPT | Performed by: PHYSICAL THERAPIST

## 2021-12-20 NOTE — PROGRESS NOTES
Physical Therapy Daily Treatment Note      Patient: Nick Paez   : 1970  Referring practitioner: Osito Sheldon MD  Date of Initial Visit: Type: THERAPY  Noted: 2021  Today's Date: 2021  Patient seen for 10 sessions       Visit Diagnoses:    ICD-10-CM ICD-9-CM   1. Pain, neck  M54.2 723.1   2. Chronic bilateral low back pain without sciatica  M54.50 724.2    G89.29 338.29       Subjective   Nick Paez reports: Having a headache today, indicates L occipital region, along w/ significant posterior cervical pn radiating into his upper back.     Pre tx pn score: 7 neck; 3 back  Post tx pn score: 3 neck; 0 back      Objective   See Exercise, Manual, and Modality Logs for complete treatment.       Assessment & Plan     Assessment    Assessment details: Pt presented w/ complaint of occipital HA, post cervical and upper thoracic back, low back pn. All significantly improved after MT techniques including cervical/upper thoracic/lumbar joint mobilization. Performed lumbar/thoracic mobs in sidelying due to intolerance to prone lying. Followed w/ pt directed stretching exercises to improve paraspinal flexibility and promote more neutral alignment to offload cervical musculature. Discussed adding thoracic mob in chair to HEP.    Plan  Plan details: Cont w/ joint mobilizations, stretching and light strengthening as tolerated          Timed:         Manual Therapy:    23     mins  05680;     Therapeutic Exercise:    9     mins  58351;     Neuromuscular Roni:    8    mins  89182;    Therapeutic Activity:     0     mins  00135;     Gait Trainin     mins  24934;     Ultrasound:     0     mins  16512;    Ionto                               0    mins   78781  Self Care                       0     mins   15184  Canalith Repos    0     mins 96336      Un-Timed:  Electrical Stimulation:    0     mins  07116 ( );  Dry Needling     0     mins self-pay  Traction     0     mins 61410      Timed  Treatment:   40   mins   Total Treatment:     40   mins    Amanda Paez, PT  [place] License: #[id number]

## 2021-12-27 ENCOUNTER — TREATMENT (OUTPATIENT)
Dept: PHYSICAL THERAPY | Facility: CLINIC | Age: 51
End: 2021-12-27

## 2021-12-27 DIAGNOSIS — G89.29 CHRONIC BILATERAL LOW BACK PAIN WITHOUT SCIATICA: ICD-10-CM

## 2021-12-27 DIAGNOSIS — M54.50 CHRONIC BILATERAL LOW BACK PAIN WITHOUT SCIATICA: ICD-10-CM

## 2021-12-27 DIAGNOSIS — M54.2 PAIN, NECK: Primary | ICD-10-CM

## 2021-12-27 PROCEDURE — 97112 NEUROMUSCULAR REEDUCATION: CPT | Performed by: PHYSICAL THERAPIST

## 2021-12-27 PROCEDURE — 97110 THERAPEUTIC EXERCISES: CPT | Performed by: PHYSICAL THERAPIST

## 2021-12-27 PROCEDURE — 97530 THERAPEUTIC ACTIVITIES: CPT | Performed by: PHYSICAL THERAPIST

## 2021-12-27 PROCEDURE — 97140 MANUAL THERAPY 1/> REGIONS: CPT | Performed by: PHYSICAL THERAPIST

## 2021-12-27 NOTE — PROGRESS NOTES
Physical Therapy Reassessment  Patient: Nick Paez   : 1970  Diagnosis/ICD-10 Code:  Pain, neck [M54.2]  Referring practitioner: Osito Sheldon MD  Date of Initial Visit: 2021  Today's Date: 2021  Patient seen for 11 sessions         Visit Diagnoses:    ICD-10-CM ICD-9-CM   1. Pain, neck  M54.2 723.1   2. Chronic bilateral low back pain without sciatica  M54.50 724.2    G89.29 338.29       Subjective Questionnaire: NDI:=44% impairment  Oswestry: =44% impairment  Clinical Progress: improved  Home Program Compliance: Yes  Treatment has included: therapeutic exercise, neuromuscular re-education, manual therapy and therapeutic activity      Subjective   Nick Paez reports: Overall pn seems to be better. Had a long weekend off work w/ the holiday which helped. 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    Pre tx pn score: 4-back, 0-neck  Post tx pn score: 4      Objective          Postural Observations  Seated posture: poor  Standing posture: fair        Tenderness     Additional Tenderness Details  TTP B UT, LS, mid/lower cervical paraspinals, CT junction  TTP lumbar spinous processes, facets, paravertebrals bilaterally-most prominent upper/mid lumbar segments    Neurological Testing     Sensation   Cervical/Thoracic   Left   Intact: light touch    Right   Intact: light touch    Lumbar   Left   Intact: light touch    Right   Intact: light touch    Reflexes   Left   Biceps (C5/C6): absent (0)  Brachioradialis (C6): absent (0)  Patellar (L4): trace (1+)  Achilles (S1): trace (1+)    Right   Biceps (C5/C6): absent (0)  Brachioradialis (C6): absent (0)  Patellar (L4): trace (1+)  Achilles (S1): trace (1+)    Active Range of Motion   Cervical/Thoracic Spine   Cervical    Flexion: 70 degrees   Extension: 45 degrees   Left lateral flexion: 32 degrees   Right lateral flexion: 45 degrees   Left rotation: 60 degrees   Right rotation: 60  degrees     Additional Active Range of Motion Details  flxn-tightness L>R  R SB-contralateral tightness  L SB-ipsilateral pn  R cervical pn w/ bilateral rotation  Trunk Flxn 100% with pn  Trunk Extn 30 deg without pn  Trunk Rotation L WFL no pn R WFL no pn  Trunk SB L fingertips past KJL w/ L side pn;  R fingertips past KJL w/ R side pn      Strength/Myotome Testing     Left Shoulder     Planes of Motion   External rotation at 0°: 4     Isolated Muscles   Middle trapezius: 4+     Right Shoulder     Planes of Motion   External rotation at 0°: 4     Isolated Muscles   Middle trapezius: 4-     Left Elbow   Flexion: 5  Extension: 5    Right Elbow   Flexion: 5  Extension: 5    Left Wrist/Hand   Wrist extension: 5  Wrist flexion: 5    Right Wrist/Hand   Wrist extension: 5  Wrist flexion: 5    Left Hip   Planes of Motion   Flexion: 5  Extension: 4+    Right Hip   Planes of Motion   Flexion: 5  Extension: 4+    Left Knee   Flexion: 5  Extension: 5    Right Knee   Flexion: 5  Extension: 5    Left Ankle/Foot   Dorsiflexion: 5  Plantar flexion: 5  Great toe extension: 5    Right Ankle/Foot   Dorsiflexion: 5  Plantar flexion: 5  Great toe extension: 5    Muscle Activation   Patient able to activate left transverse abdominals, left multifidus, right transverse abdominals and right multifidus.     Tests     Lumbar     Left   Negative passive SLR.     Right   Negative passive SLR.     Additional Tests Details  Cervical distraction/compression (-)      See Exercise, Manual, and Modality Logs for complete treatment.       Assessment & Plan     Assessment    Assessment details: Reassessment performed. Pt has completed 11 PT visits. He reports some reduction in both neck and back pn. Pn continues to be exacerbated by work activities, especially those that required sustained postures, as well as driving long distance or prolonged sitting in hard chairs. Pt demonstrates gradual improvements in cervical and trunk mobility, core and LE  strength. He is making progress toward PT goals, though slowly. Exercise progression somewhat limited by unrelated joint pn that reduces tolerance to certain activities. Pt would benefit from ongoing PT to maximize strength and mobility, and allow return to full work and daily activities w/o pn or dysfunction.    Goals  Plan Goals: STG 4 wks  1) Pt to be compliant w/ initial HEP for ROM, strength and symptom mgmt.-MET  2) Pt to report pn w/ daily and work activities to be no greater than 6/10.-ONGOING  3) Pt to demo restored cervical mobility in all planes.-PARTIALLY MET  4) Pt to demo improved activation of the DNFs and deep core stabilizers.-MET      LTG 8 wks  1) Pt to be independent w/ long term HEP and self mgmt.-PROGRESSING  2) Pt to report pn w/ daily and work activities to be no greater than 2/10.-ONGOING  3) Pt to report resolution of RUE paresthesia.-PROGRESSING  4) Pt to improve Mod Oswestry score to 10/50 or better to reflect improved pn and function.-ONGOING  5) Pt to improve NDI score to 12/50 or better to reflect improved pn and function.-ONGOING    Plan  Plan details: Cont w/ focus on improving core and BLE strength, postural strength, and flexibility; pt responds well to cervical/lumbar joint mobilization vs STM        Progress toward previous goals: Partially Met        Timed:         Manual Therapy:    10     mins  52907;     Therapeutic Exercise:    12     mins  38539;     Neuromuscular Roni:    8    mins  20509;    Therapeutic Activity:     15     mins  42048;     Gait Trainin     mins  60193;     Ultrasound:     0     mins  69785;    Ionto                               0    mins   12219  Self Care                       0     mins   09803  Canalith Repos    0     mins 78636      Un-Timed:  Electrical Stimulation:    0     mins  12899 ( );  Dry Needling     0     mins self-pay  Traction     0     mins 78646  Re-Eval                           0    mins  34925      Timed Treatment:    45   mins   Total Treatment:     45   mins          PT: Amanda Paez, PT     [place] License: #[id number]    Electronically signed by Amanda Paez, PT, 12/27/21, 3:46 PM EST

## 2021-12-30 ENCOUNTER — TELEPHONE (OUTPATIENT)
Dept: NEUROLOGY | Facility: CLINIC | Age: 51
End: 2021-12-30

## 2021-12-30 NOTE — TELEPHONE ENCOUNTER
Caller: MEGHNA     Relationship: PCP    Best call back number: [phone]    What form or medical record are you requesting: LAST 3 OFFICE NOTES     Who is requesting this form or medical record from you: PCP    How would you like to receive the form or medical records (pick-up, mail, fax):   FAX: [phone]    Timeframe paperwork needed: ASAP    Additional notes: NEEDING LAST 3 OV NOTES

## 2022-01-04 ENCOUNTER — TREATMENT (OUTPATIENT)
Dept: PHYSICAL THERAPY | Facility: CLINIC | Age: 52
End: 2022-01-04

## 2022-01-04 ENCOUNTER — TRANSCRIBE ORDERS (OUTPATIENT)
Dept: PHYSICAL THERAPY | Facility: CLINIC | Age: 52
End: 2022-01-04

## 2022-01-04 DIAGNOSIS — M54.2 PAIN, NECK: Primary | ICD-10-CM

## 2022-01-04 PROCEDURE — 97110 THERAPEUTIC EXERCISES: CPT | Performed by: PHYSICAL THERAPIST

## 2022-01-04 NOTE — PROGRESS NOTES
"   Physical Therapy Daily Progress Note      Patient: Nick Paez   : 1970  Diagnosis/ICD-10 Code:  Pain, neck [M54.2]  Referring practitioner: Osito Sheldon MD  Date of Initial Visit: Type: THERAPY  Noted: 2021  Today's Date: 2022  Patient seen for 12 sessions         Nick Paez    Subjective \"I am tired. 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    Objective   See Exercise, Manual, and Modality Logs for complete treatment.   3 way RTB rows and wall push ups added to HEP.      Assessment/Plan 25 min of exercise, pt fatigued.  Pt able to self correct posture throughout session. Tactile cuing for posture and scapular mm firing throughout session.  Edu with importance of posture and cont strengthening to improve functional strength and mobility.      Progress per Plan of Care           Manual Therapy:         mins  66475;  Therapeutic Exercise:    25     mins  71775;     Neuromuscular Roni:        mins  50374;    Therapeutic Activity:          mins  69697;     Gait Training:           mins  22016;     Ultrasound:          mins  20741;    Electrical Stimulation:         mins  13799 ( );  Dry Needling          mins self-pay    Timed Treatment:   25   mins   Total Treatment:     25   mins    Roshni Martino PTA  Physical Therapist Assistant                     "

## 2022-01-13 ENCOUNTER — TREATMENT (OUTPATIENT)
Dept: PHYSICAL THERAPY | Facility: CLINIC | Age: 52
End: 2022-01-13

## 2022-01-13 DIAGNOSIS — M54.2 PAIN, NECK: Primary | ICD-10-CM

## 2022-01-13 DIAGNOSIS — M54.50 CHRONIC BILATERAL LOW BACK PAIN WITHOUT SCIATICA: ICD-10-CM

## 2022-01-13 DIAGNOSIS — G89.29 CHRONIC BILATERAL LOW BACK PAIN WITHOUT SCIATICA: ICD-10-CM

## 2022-01-13 PROCEDURE — 97140 MANUAL THERAPY 1/> REGIONS: CPT | Performed by: PHYSICAL THERAPIST

## 2022-01-13 PROCEDURE — 97110 THERAPEUTIC EXERCISES: CPT | Performed by: PHYSICAL THERAPIST

## 2022-01-13 PROCEDURE — 97112 NEUROMUSCULAR REEDUCATION: CPT | Performed by: PHYSICAL THERAPIST

## 2022-01-13 NOTE — PROGRESS NOTES
Physical Therapy Daily Treatment Note      Patient: Nick Paez   : 1970  Referring practitioner: No ref. provider found  Date of Initial Visit: Type: THERAPY  Noted: 2021  Today's Date: 2022  Patient seen for 13 sessions       Visit Diagnoses:    ICD-10-CM ICD-9-CM   1. Pain, neck  M54.2 723.1   2. Chronic bilateral low back pain without sciatica  M54.50 724.2    G89.29 338.29       Subjective   Nick Peaz reports: Back is bothering him today. Neck doesn't feel too bad.    Pre tx pn score: 5  Post tx pn score: 3      Objective   See Exercise, Manual, and Modality Logs for complete treatment.       Assessment & Plan     Assessment    Assessment details: Limited w/ attempted thoracic rotation/flexion to the L 2/2 R low back tightness and discomfort. Reported less tenderness w/ PA gliding of the lower lumbar segments than at previous visits and also noted dec pn at end of tx session. Again very fatigued w/ min exercise. Encouraged standing quad stretch during the day at work. Pt verbalized understanding.    Plan  Plan details: Cont w/ joint mobilization, light stretching, core/hip strengthening          Timed:         Manual Therapy:    12     mins  44004;     Therapeutic Exercise:    15     mins  28128;     Neuromuscular Roni:    10    mins  87668;    Therapeutic Activity:     0     mins  54163;     Gait Trainin     mins  64265;     Ultrasound:     0     mins  32152;    Ionto                               0    mins   18481  Self Care                       0     mins   04580  Canalith Repos    0     mins 57447      Un-Timed:  Electrical Stimulation:    0     mins  58158 ( );  Dry Needling     0     mins self-pay  Traction     0     mins 40164      Timed Treatment:   37   mins   Total Treatment:     37   mins    Amanda Paez, PT  [place] License: #[id number]

## 2022-01-18 ENCOUNTER — TREATMENT (OUTPATIENT)
Dept: PHYSICAL THERAPY | Facility: CLINIC | Age: 52
End: 2022-01-18

## 2022-01-18 DIAGNOSIS — G89.29 CHRONIC BILATERAL LOW BACK PAIN WITHOUT SCIATICA: ICD-10-CM

## 2022-01-18 DIAGNOSIS — M54.2 PAIN, NECK: Primary | ICD-10-CM

## 2022-01-18 DIAGNOSIS — M54.50 CHRONIC BILATERAL LOW BACK PAIN WITHOUT SCIATICA: ICD-10-CM

## 2022-01-18 PROCEDURE — 97112 NEUROMUSCULAR REEDUCATION: CPT | Performed by: PHYSICAL THERAPIST

## 2022-01-18 PROCEDURE — 97110 THERAPEUTIC EXERCISES: CPT | Performed by: PHYSICAL THERAPIST

## 2022-01-18 PROCEDURE — 97140 MANUAL THERAPY 1/> REGIONS: CPT | Performed by: PHYSICAL THERAPIST

## 2022-01-18 NOTE — PROGRESS NOTES
Physical Therapy Daily Treatment Note      Patient: Nick Paez   : 1970  Referring practitioner: Santa Galloway PA-C  Date of Initial Visit: Type: THERAPY  Noted: 2021  Today's Date: 2022  Patient seen for 14 sessions       Visit Diagnoses:    ICD-10-CM ICD-9-CM   1. Pain, neck  M54.2 723.1   2. Chronic bilateral low back pain without sciatica  M54.50 724.2    G89.29 338.29       Subjective   Nick Paez reports: Back is doing ok but having a lot of pn in the L side of his neck today. Having to look down at equipment all day during work irritates his neck pn, while prolonged standing aggravates his low back. No HA today, but does report dizziness yesterday when he would rise from sitting or laying down. Doesn't seem as bad today.    Pre tx pn score: 6-7 L neck/shoulder blade; 3-4 low back  Post tx pn score: 4 neck      Objective   See Exercise, Manual, and Modality Logs for complete treatment.       Assessment & Plan     Assessment    Assessment details: Pt reports inc L cervical pn today after working. Demonstrates painful/limited L cervical rotation and SB, which elicits lower cervical and medial scapular border pn, indicating facet dysfunction. Worked on MT techniques to mobilize the cervical facets, reduce muscle tone of adjacent paraspinals followed by light stretching and postural exercise. Symptoms more irritable today than usual, but neck pn mildly improved by end of visit.    Plan  Plan details: Cont w/ current POC          Timed:         Manual Therapy:    23     mins  30522;     Therapeutic Exercise:    15     mins  72118;     Neuromuscular Roni:    8    mins  91928;    Therapeutic Activity:     0     mins  53726;     Gait Trainin     mins  24198;     Ultrasound:     0     mins  82392;    Ionto                               0    mins   91731  Self Care                       0     mins   42387  Canalith Repos    0     mins 69309      Un-Timed:  Electrical Stimulation:    0      mins  53775 ( );  Dry Needling     0     mins self-pay  Traction     0     mins 07199      Timed Treatment:   46   mins   Total Treatment:     46   mins    Amanda Paez, PT  [place] License: #[id number]

## 2022-01-25 ENCOUNTER — TREATMENT (OUTPATIENT)
Dept: PHYSICAL THERAPY | Facility: CLINIC | Age: 52
End: 2022-01-25

## 2022-01-25 DIAGNOSIS — M54.50 CHRONIC BILATERAL LOW BACK PAIN WITHOUT SCIATICA: ICD-10-CM

## 2022-01-25 DIAGNOSIS — M54.2 PAIN, NECK: Primary | ICD-10-CM

## 2022-01-25 DIAGNOSIS — G89.29 CHRONIC BILATERAL LOW BACK PAIN WITHOUT SCIATICA: ICD-10-CM

## 2022-01-25 PROCEDURE — 97110 THERAPEUTIC EXERCISES: CPT | Performed by: PHYSICAL THERAPIST

## 2022-01-25 PROCEDURE — 97140 MANUAL THERAPY 1/> REGIONS: CPT | Performed by: PHYSICAL THERAPIST

## 2022-01-25 PROCEDURE — 97112 NEUROMUSCULAR REEDUCATION: CPT | Performed by: PHYSICAL THERAPIST

## 2022-01-25 NOTE — PROGRESS NOTES
"Physical Therapy Daily Treatment Note      Patient: Nick Paez   : 1970  Referring practitioner: Osito Sheldon MD  Date of Initial Visit: Type: THERAPY  Noted: 2021  Today's Date: 2022  Patient seen for 15 sessions       Visit Diagnoses:    ICD-10-CM ICD-9-CM   1. Pain, neck  M54.2 723.1   2. Chronic bilateral low back pain without sciatica  M54.50 724.2    G89.29 338.29       Subjective   Nick Paez reports: \"My neck hurt really bad over the weekend, especially to look sideways. I had my booster shot Friday so I don't know if that could have contributed. My R low back is bothering me today.\"    Pre tx pn score: 5  Post tx pn score: 3      Objective   See Exercise, Manual, and Modality Logs for complete treatment.     Trunk mobility WFL   Contralateral back pn w/ rotation bilaterally  contralateral discomfort/tightness w/ SB bilaterally  Sharp pn L low back w/ forward bend and extn      Assessment & Plan     Assessment    Assessment details: Pt c/o inc low back discomfort after working today. Neck pn minimal. He reports pn w/ active trunk mobility in most planes, though ROM functional. Pn markedly improved after joint mobilization. MWM provided no additional relief. Spent remainder of visit on general trunk strengthening. Pt easily fatigued, noted mild inc in low back immediately afterward, but improved w/ brief rest period before leaving clinic. Issued updated HEP and provided TB for home use.    Plan  Plan details: reassess          Timed:         Manual Therapy:    15     mins  90600;     Therapeutic Exercise:    23     mins  64648;     Neuromuscular Roni:    8    mins  49544;    Therapeutic Activity:     0     mins  79407;     Gait Trainin     mins  62388;     Ultrasound:     0     mins  42545;    Ionto                               0    mins   04413  Self Care                       0     mins   84616  Canalith Repos    0     mins 49192      Un-Timed:  Electrical Stimulation:   "  0     mins  13494 ( );  Dry Needling     0     mins self-pay  Traction     0     mins 94824      Timed Treatment:   46   mins   Total Treatment:     46   mins    Amanda Paez, PT  [place] License: #[id number]

## 2022-01-27 ENCOUNTER — TELEPHONE (OUTPATIENT)
Dept: NEUROLOGY | Facility: OTHER | Age: 52
End: 2022-01-27

## 2022-01-27 ENCOUNTER — TREATMENT (OUTPATIENT)
Dept: PHYSICAL THERAPY | Facility: CLINIC | Age: 52
End: 2022-01-27

## 2022-01-27 DIAGNOSIS — M54.50 CHRONIC BILATERAL LOW BACK PAIN WITHOUT SCIATICA: ICD-10-CM

## 2022-01-27 DIAGNOSIS — G89.29 CHRONIC BILATERAL LOW BACK PAIN WITHOUT SCIATICA: ICD-10-CM

## 2022-01-27 DIAGNOSIS — M54.2 PAIN, NECK: Primary | ICD-10-CM

## 2022-01-27 PROCEDURE — 97530 THERAPEUTIC ACTIVITIES: CPT | Performed by: PHYSICAL THERAPIST

## 2022-01-27 PROCEDURE — 97140 MANUAL THERAPY 1/> REGIONS: CPT | Performed by: PHYSICAL THERAPIST

## 2022-01-27 PROCEDURE — 97110 THERAPEUTIC EXERCISES: CPT | Performed by: PHYSICAL THERAPIST

## 2022-01-27 NOTE — PROGRESS NOTES
Physical Therapy Re Certification Of Plan of Care  Patient: Nick Paez   : 1970  Diagnosis/ICD-10 Code:  Pain, neck [M54.2]  Referring practitioner: Osito Sheldon MD  Date of Initial Visit: Type: THERAPY  Noted: 2021  Today's Date: 2022  Patient seen for 16 sessions         Visit Diagnoses:    ICD-10-CM ICD-9-CM   1. Pain, neck  M54.2 723.1   2. Chronic bilateral low back pain without sciatica  M54.50 724.2    G89.29 338.29         Nick Paez reports: Doing a little better today. Reports mild back pn, little neck pn. Overall is seeing some improvements in strength, but doesn't feel that is pn is significantly changed.     Subjective Questionnaire: NDI:=42% impairment  Oswestry: =44% impairment  Clinical Progress: improved  Home Program Compliance: Yes  Treatment has included: therapeutic exercise, neuromuscular re-education, manual therapy and therapeutic activity     Pn 4/10 bilateral low back, 3/10 neck    Subjective       Objective          Postural Observations  Seated posture: fair  Standing posture: fair        Tenderness     Additional Tenderness Details  TTP B UT, LS, mid/lower cervical paraspinals, CT junction  TTP lumbar spinous processes, facets, paravertebrals bilaterally-most prominent lower lumbar segments    Neurological Testing     Sensation   Cervical/Thoracic   Left   Intact: light touch    Right   Intact: light touch    Lumbar   Left   Intact: light touch    Right   Intact: light touch    Reflexes   Left   Biceps (C5/C6): absent (0)  Brachioradialis (C6): absent (0)  Patellar (L4): trace (1+)  Achilles (S1): trace (1+)    Right   Biceps (C5/C6): absent (0)  Brachioradialis (C6): absent (0)  Patellar (L4): trace (1+)  Achilles (S1): trace (1+)    Active Range of Motion   Cervical/Thoracic Spine   Cervical    Flexion: 40 degrees   Extension: 50 degrees   Left lateral flexion: 40 degrees   Right lateral flexion: 46 degrees   Left rotation: 60 degrees   Right  rotation: 60 degrees     Additional Active Range of Motion Details  flxn-tightness L>R  R SB-contralateral tightness  L SB-ipsilateral pn  R cervical pn w/ R rotation  Trunk Flxn 100% without pn  Trunk Extn 30 deg pn midline L5  Trunk Rotation L WFL, midline L5 pn R WFL no pn  Trunk SB L fingertips past KJL w/ L side pn;  R fingertips past KJL w/ R side pn      Strength/Myotome Testing     Left Shoulder     Planes of Motion   External rotation at 0°: 4     Isolated Muscles   Middle trapezius: 4+     Right Shoulder     Planes of Motion   External rotation at 0°: 4     Isolated Muscles   Middle trapezius: 4-     Left Elbow   Flexion: 5  Extension: 5    Right Elbow   Flexion: 5  Extension: 5    Left Wrist/Hand   Wrist extension: 5  Wrist flexion: 5    Right Wrist/Hand   Wrist extension: 5  Wrist flexion: 5    Left Hip   Planes of Motion   Flexion: 5  Extension: 5  Abduction: 4+    Right Hip   Planes of Motion   Flexion: 5  Extension: 5  Abduction: 4+    Left Knee   Flexion: 5  Extension: 5    Right Knee   Flexion: 5  Extension: 5    Left Ankle/Foot   Dorsiflexion: 5  Plantar flexion: 5  Great toe extension: 5    Right Ankle/Foot   Dorsiflexion: 5  Plantar flexion: 5  Great toe extension: 5    Muscle Activation   Patient able to activate left transverse abdominals, left multifidus, right transverse abdominals and right multifidus.     Tests     Lumbar     Left   Negative passive SLR.     Right   Negative passive SLR.     Additional Tests Details  Cervical distraction/compression (-)          Assessment & Plan     Assessment  Impairments: abnormal or restricted ROM, activity intolerance, impaired physical strength and pain with function  Functional Limitations: carrying objects, uncomfortable because of pain, sitting, standing and unable to perform repetitive tasks  Assessment details: 90 day re-cert performed today. Pt has completed 16 PT visits. He subjectively reports improvement in strength/endurance but min change  in pn level and tolerance to work/daily activities. He demonstrates full trunk/cervical mobility in all planes, though remain mildly painful. He reports ongoing B cervical discomfort, mid low back pn at the level of L5. He denies radicular symptoms. He cont to display poor postural awareness, general core weakness and impaired muscle endurance, though improved from start of care. He may benefit from ongoing PT, though progress to date has been slow and exercise tolerance is limited by pn/fatigue. Encouraged returning to referring provider to discuss ongoing symptoms.  Prognosis: fair    Goals  Plan Goals: STG 4 wks  1) Pt to be compliant w/ initial HEP for ROM, strength and symptom mgmt.-MET  2) Pt to report pn w/ daily and work activities to be no greater than 6/10.-ONGOING  3) Pt to demo restored cervical mobility in all planes.-MET  4) Pt to demo improved activation of the DNFs and deep core stabilizers.-MET      LTG 8 wks  1) Pt to be independent w/ long term HEP and self mgmt.-PROGRESSING  2) Pt to report pn w/ daily and work activities to be no greater than 2/10.-ONGOING  3) Pt to report resolution of RUE paresthesia.-MET  4) Pt to improve Mod Oswestry score to 10/50 or better to reflect improved pn and function.-ONGOING  5) Pt to improve NDI score to 12/50 or better to reflect improved pn and function.-ONGOING      Plan  Therapy options: will be seen for skilled therapy services  Planned modality interventions: cryotherapy, TENS and thermotherapy (hydrocollator packs)  Planned therapy interventions: abdominal trunk stabilization, body mechanics training, postural training, flexibility, home exercise program, manual therapy, spinal/joint mobilization, soft tissue mobilization, stretching and strengthening  Frequency: 2x week  Duration in weeks: 12  Plan details: TE/TA/NMR/MT to address remaining deficits; focus on improving core/cervical/LE strength/endurance; ergonomics training w/ job specific  tasks           Recommendations: Continue as planned  Timeframe: 3 months  Prognosis to achieve goals: fair      Timed:         Manual Therapy:    12     mins  54248;     Therapeutic Exercise:    15     mins  08422;     Neuromuscular Roni:    0    mins  63197;    Therapeutic Activity:     23     mins  74968;     Gait Trainin     mins  74825;     Ultrasound:     0     mins  41954;    Ionto                               0    mins   51505  Self Care                       0     mins   11512  Canalith Repos    0     mins 00335      Un-Timed:  Electrical Stimulation:    0     mins  77767 (MC );  Dry Needling     0     mins self-pay  Traction     0     mins 62856  Re-Eval                           0    mins  81329      Timed Treatment:   50   mins   Total Treatment:     50   mins          PT: Amanda Paez PT     [place] License:  [id number]    Electronically signed by Amanda Paez PT, 22, 3:57 PM EST    Certification Period: 2022 thru 2022  I certify that the therapy services are furnished while this patient is under my care.  The services outlined above are required by this patient, and will be reviewed every 90 days.         Physician Signature:__________________________________________________    PHYSICIAN: Osito Sheldon MD      DATE:     Please sign and return via fax to [email or web address] . Thank you, [facility]

## 2022-02-01 NOTE — TELEPHONE ENCOUNTER
[employer] called. States patient was able to get in for neuropsych testing sooner with a Dr. Carreon at [facility] in [place]. Please advise.

## 2022-02-01 NOTE — TELEPHONE ENCOUNTER
HUB TO READ    Unable to return call as there was no phone number on the encounter.     Per Santa: I am not familiar with this location either. I would recommend keeping the [facility] appt.regardless of whether they have the testing performed here as well.    We can send any needed documents to [facility], but based on rigor of testing the [facility] apt may still be needed to answer all questions and properly assess.

## 2022-02-08 ENCOUNTER — TREATMENT (OUTPATIENT)
Dept: PHYSICAL THERAPY | Facility: CLINIC | Age: 52
End: 2022-02-08

## 2022-02-08 DIAGNOSIS — M54.50 CHRONIC BILATERAL LOW BACK PAIN WITHOUT SCIATICA: Primary | ICD-10-CM

## 2022-02-08 DIAGNOSIS — G89.29 CHRONIC BILATERAL LOW BACK PAIN WITHOUT SCIATICA: Primary | ICD-10-CM

## 2022-02-08 PROCEDURE — 97110 THERAPEUTIC EXERCISES: CPT | Performed by: PHYSICAL THERAPIST

## 2022-02-08 PROCEDURE — 97112 NEUROMUSCULAR REEDUCATION: CPT | Performed by: PHYSICAL THERAPIST

## 2022-02-08 NOTE — PROGRESS NOTES
"   Physical Therapy Daily Progress Note        Patient: Nick Paez   : 1970  Diagnosis/ICD-10 Code:  Chronic bilateral low back pain without sciatica [M54.50, G89.29]  Referring practitioner: Santa Galloway PA-C  Date of Initial Visit: Type: THERAPY  Noted: 2021  Today's Date: 2022  Patient seen for 17 sessions         Nick Paez       Subjective \"My back is really sore and tight today.\"  Denies radicular pain.      Objective   See Exercise, Manual, and Modality Logs for complete treatment.       Assessment/Plan sitting and standing flex/ext exs per flow sheet without relief in pain.  Pt supine hooklying with MH to LB LB stretching promoting mm fiber relaxation.  Decreased pain after 90/90 positional release.  edu pt with decompression lying at home 90/90 and long extended legs at wall.      Progress per Plan of Care           Manual Therapy:         mins  49437;  Therapeutic Exercise:    12     mins  03858;     Neuromuscular Roni:    15    mins  21143;    Therapeutic Activity:          mins  06744;     Gait Training:           mins  35854;     Ultrasound:          mins  61411;    Electrical Stimulation:         mins  65305 ( );  Dry Needling          mins self-pay    Timed Treatment:   27   mins   Total Treatment:     30   mins    Roshni Martino PTA  Physical Therapist Assistant                    "

## 2022-02-10 ENCOUNTER — TREATMENT (OUTPATIENT)
Dept: PHYSICAL THERAPY | Facility: CLINIC | Age: 52
End: 2022-02-10

## 2022-02-10 DIAGNOSIS — M54.50 CHRONIC BILATERAL LOW BACK PAIN WITHOUT SCIATICA: Primary | ICD-10-CM

## 2022-02-10 DIAGNOSIS — M54.2 PAIN, NECK: ICD-10-CM

## 2022-02-10 DIAGNOSIS — G89.29 CHRONIC BILATERAL LOW BACK PAIN WITHOUT SCIATICA: Primary | ICD-10-CM

## 2022-02-10 PROCEDURE — 97530 THERAPEUTIC ACTIVITIES: CPT | Performed by: PHYSICAL THERAPIST

## 2022-02-10 PROCEDURE — 97110 THERAPEUTIC EXERCISES: CPT | Performed by: PHYSICAL THERAPIST

## 2022-02-10 NOTE — PATIENT INSTRUCTIONS
Access Code: [email or web address]  URL: [email or web address]  Date: 02/10/2022  Prepared by: Amanda Paez    Exercises  Seated Bent Over Cervical Extension - 1 x daily - 15-30 reps  Seated Assisted Cervical Rotation with Towel - 1 x daily - 2 sets - 10 reps  Cervical Retraction with Resistance - 1 x daily - 2 sets - 10 reps  Seated Gentle Upper Trapezius Stretch - 1 x daily - 3 reps - 15 sec hold  Shoulder External Rotation and Scapular Retraction with Resistance - 1 x daily - 15-30 reps  Standing Shoulder Horizontal Abduction with Resistance - 1 x daily - 15-30 reps  Cat-Camel - 1 x daily - 10 reps  Supine Bridge - 1 x daily - 15-30 reps  Seated Thoracic Extension with Resistance - 1 x daily - 15-30 reps  Seated Forward Bending with PLB - 1 x daily - 15-30 reps  Standing Anti-Rotation Press with Anchored Resistance - 1 x daily - 15-30 reps

## 2022-02-11 NOTE — PROGRESS NOTES
Physical Therapy Daily Treatment Note      Patient: Nick Paez   : 1970  Referring practitioner: Osito Sheldon MD  Date of Initial Visit: Type: THERAPY  Noted: 2021  Today's Date: 2/10/2022  Patient seen for 18 sessions       Visit Diagnoses:    ICD-10-CM ICD-9-CM   1. Chronic bilateral low back pain without sciatica  M54.50 724.2    G89.29 338.29   2. Pain, neck  M54.2 723.1       Subjective   Nick Paez reports: Not having much neck pn at the moment. Still experiences a throbbing pn that is nearly constant at the base of his head on the L, radiating into the top of his neck, as well as R lower cervical/scapular discomfort w/ R rotation or SB. L low back pn remains nearly constant as well, worsened w/ prolonged or repetitive activities, sustained postures. Able to get some relief w/ positional traction techniques reviewed last visit.    Pre tx pn score: 4  Post tx pn score: 4      Objective   See Exercise, Manual, and Modality Logs for complete treatment.       Assessment & Plan     Assessment    Assessment details: Pt reports some improvement in neck pn, min change in low back pn despite seeing progress w/ strength. Instructed in self cervical SNAGs w/ towel into rotation and SB, which improved R lower cervical/scapular discomfort experienced w/ movement. Spent remainder of visit reviewing/practicing updated HEP. Discussed returning to referring provider given slow progress w/ PT, but encouraged to continue w/ strengthening/mobility program to improve muscle function and reduce strain on spine. He verbalized understanding.    Plan  Plan details: Await MD recommendations, continue w/ HEP          Timed:         Manual Therapy:    0     mins  54339;     Therapeutic Exercise:    25     mins  90762;     Neuromuscular Roni:    0    mins  81519;    Therapeutic Activity:     15     mins  59488;     Gait Trainin     mins  89532;     Ultrasound:     0     mins  86132;    Ionto                                0    mins   85235  Self Care                       0     mins   97504  Canalith Repos    0     mins 52831      Un-Timed:  Electrical Stimulation:    0     mins  21932 ( );  Dry Needling     0     mins self-pay  Traction     0     mins 96408      Timed Treatment:   40   mins   Total Treatment:     40   mins    Amanda Paez, PT  [place] License: #[id number]

## 2022-03-10 ENCOUNTER — OFFICE VISIT (OUTPATIENT)
Dept: NEUROLOGY | Facility: CLINIC | Age: 52
End: 2022-03-10

## 2022-03-10 DIAGNOSIS — R41.3 MEMORY LOSS: ICD-10-CM

## 2022-03-10 DIAGNOSIS — R47.9 DIFFICULTY WITH SPEECH: Primary | ICD-10-CM

## 2022-03-10 PROCEDURE — 99215 OFFICE O/P EST HI 40 MIN: CPT | Performed by: PHYSICIAN ASSISTANT

## 2022-03-10 RX ORDER — ESCITALOPRAM OXALATE 5 MG/1
5 TABLET ORAL DAILY
Qty: 30 TABLET | Refills: 11 | Status: SHIPPED | OUTPATIENT
Start: 2022-03-10 | End: 2022-06-22

## 2022-03-11 ENCOUNTER — TELEPHONE (OUTPATIENT)
Dept: NEUROLOGY | Facility: CLINIC | Age: 52
End: 2022-03-11
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
## 2022-03-28 ENCOUNTER — TELEPHONE (OUTPATIENT)
Dept: NEUROLOGY | Facility: CLINIC | Age: 52
End: 2022-03-28

## 2022-03-28 NOTE — TELEPHONE ENCOUNTER
Provider: SUSY POZO    Caller: [facility]    Relationship to Patient: [employer]    Phone Number: [phone]    Reason for Call: SHARONDA FROM PT'S [employer] IS CALLING TO STATE THAT PT'S REFERRAL TO SPEECH THERAPY HAS BEEN DENIED.  HE HAS ALSO FAXED THIS TO THE PRACTICE AS WELL.

## 2022-03-31 NOTE — TELEPHONE ENCOUNTER
New fax scanned into chart that looks like [employer] is terminated. Speech Therapy will need to go through health insurance.

## 2022-06-22 ENCOUNTER — OFFICE VISIT (OUTPATIENT)
Dept: NEUROLOGY | Facility: CLINIC | Age: 52
End: 2022-06-22

## 2022-06-22 VITALS — HEART RATE: 85 BPM | OXYGEN SATURATION: 98 % | DIASTOLIC BLOOD PRESSURE: 88 MMHG | SYSTOLIC BLOOD PRESSURE: 150 MMHG

## 2022-06-22 DIAGNOSIS — R41.3 MEMORY LOSS: ICD-10-CM

## 2022-06-22 DIAGNOSIS — R47.9 DIFFICULTY WITH SPEECH: Primary | ICD-10-CM

## 2022-06-22 PROCEDURE — 99214 OFFICE O/P EST MOD 30 MIN: CPT | Performed by: PHYSICIAN ASSISTANT

## 2022-06-22 RX ORDER — ESCITALOPRAM OXALATE 10 MG/1
10 TABLET ORAL DAILY
Qty: 30 TABLET | Refills: 11 | Status: SHIPPED | OUTPATIENT
Start: 2022-06-22 | End: 2023-02-27

## 2022-06-22 RX ORDER — TERBINAFINE HYDROCHLORIDE 250 MG/1
250 TABLET ORAL DAILY
COMMUNITY
Start: 2022-05-26 | End: 2023-02-27
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
## 2022-11-21 ENCOUNTER — OFFICE VISIT (OUTPATIENT)
Dept: NEUROLOGY | Facility: CLINIC | Age: 52
End: 2022-11-21

## 2022-11-21 DIAGNOSIS — R41.3 MEMORY LOSS: ICD-10-CM

## 2022-11-21 DIAGNOSIS — R47.9 DIFFICULTY WITH SPEECH: Primary | ICD-10-CM

## 2022-11-21 PROCEDURE — 99214 OFFICE O/P EST MOD 30 MIN: CPT | Performed by: PHYSICIAN ASSISTANT

## 2022-11-21 RX ORDER — ATORVASTATIN CALCIUM 20 MG/1
20 TABLET, FILM COATED ORAL DAILY
COMMUNITY

## 2022-11-21 RX ORDER — MELOXICAM 7.5 MG/1
7.5 TABLET ORAL DAILY
COMMUNITY
End: 2023-02-27

## 2022-11-21 RX ORDER — CYCLOBENZAPRINE HCL 5 MG
5 TABLET ORAL 3 TIMES DAILY PRN
COMMUNITY
End: 2023-02-27
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
## 2023-02-27 ENCOUNTER — OFFICE VISIT (OUTPATIENT)
Dept: NEUROLOGY | Facility: CLINIC | Age: 53
End: 2023-02-27
Payer: OTHER MISCELLANEOUS

## 2023-02-27 VITALS
HEIGHT: 73 IN | SYSTOLIC BLOOD PRESSURE: 122 MMHG | DIASTOLIC BLOOD PRESSURE: 78 MMHG | BODY MASS INDEX: 31.93 KG/M2 | HEART RATE: 89 BPM | OXYGEN SATURATION: 96 % | WEIGHT: 240.9 LBS

## 2023-02-27 DIAGNOSIS — G31.84 MILD COGNITIVE IMPAIRMENT: Primary | ICD-10-CM

## 2023-02-27 DIAGNOSIS — F33.1 MODERATE RECURRENT MAJOR DEPRESSION: ICD-10-CM

## 2023-02-27 PROCEDURE — 99215 OFFICE O/P EST HI 40 MIN: CPT | Performed by: PSYCHIATRY & NEUROLOGY

## 2023-02-27 RX ORDER — CANAGLIFLOZIN 300 MG/1
TABLET, FILM COATED ORAL
COMMUNITY
Start: 2022-11-25

## 2023-02-27 RX ORDER — DULAGLUTIDE 0.75 MG/.5ML
INJECTION, SOLUTION SUBCUTANEOUS
COMMUNITY
Start: 2023-02-03

## 2023-02-27 RX ORDER — ESCITALOPRAM OXALATE 20 MG/1
20 TABLET ORAL DAILY
Qty: 30 TABLET | Refills: 11 | Status: SHIPPED | OUTPATIENT
Start: 2023-02-27 | End: 2024-02-27
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
## 2023-03-03 ENCOUNTER — PATIENT ROUNDING (BHMG ONLY) (OUTPATIENT)
Dept: NEUROLOGY | Facility: CLINIC | Age: 53
End: 2023-03-03

## 2023-03-03 NOTE — PROGRESS NOTES
March 3, 2023    Hello, may I speak with Nick Paez?    My name is [email or web address]    I am  with [facility]  [address] [facility] [address]  [facility] [address] [phone].    Before we get started may I verify your date of birth? 1970    I am calling to officially welcome you to our practice and ask about your recent visit. Is this a good time to talk?   Patient rounding sent through [employer].